# Patient Record
Sex: FEMALE | Race: WHITE
[De-identification: names, ages, dates, MRNs, and addresses within clinical notes are randomized per-mention and may not be internally consistent; named-entity substitution may affect disease eponyms.]

---

## 2017-04-12 ENCOUNTER — NON-APPOINTMENT (OUTPATIENT)
Age: 82
End: 2017-04-12

## 2017-04-12 ENCOUNTER — APPOINTMENT (OUTPATIENT)
Dept: INTERNAL MEDICINE | Facility: CLINIC | Age: 82
End: 2017-04-12

## 2017-04-12 ENCOUNTER — MEDICATION RENEWAL (OUTPATIENT)
Age: 82
End: 2017-04-12

## 2017-04-12 VITALS
BODY MASS INDEX: 27.82 KG/M2 | WEIGHT: 137.99 LBS | HEART RATE: 86 BPM | SYSTOLIC BLOOD PRESSURE: 132 MMHG | TEMPERATURE: 97.8 F | DIASTOLIC BLOOD PRESSURE: 72 MMHG | HEIGHT: 59 IN | RESPIRATION RATE: 16 BRPM | OXYGEN SATURATION: 95 %

## 2017-04-12 DIAGNOSIS — J45.909 UNSPECIFIED ASTHMA, UNCOMPLICATED: ICD-10-CM

## 2017-04-25 ENCOUNTER — RX RENEWAL (OUTPATIENT)
Age: 82
End: 2017-04-25

## 2017-07-31 ENCOUNTER — APPOINTMENT (OUTPATIENT)
Dept: INTERNAL MEDICINE | Facility: CLINIC | Age: 82
End: 2017-07-31
Payer: MEDICARE

## 2017-07-31 ENCOUNTER — OTHER (OUTPATIENT)
Age: 82
End: 2017-07-31

## 2017-07-31 ENCOUNTER — NON-APPOINTMENT (OUTPATIENT)
Age: 82
End: 2017-07-31

## 2017-07-31 VITALS
TEMPERATURE: 98.2 F | SYSTOLIC BLOOD PRESSURE: 122 MMHG | OXYGEN SATURATION: 95 % | HEIGHT: 60 IN | DIASTOLIC BLOOD PRESSURE: 68 MMHG | HEART RATE: 91 BPM | BODY MASS INDEX: 26.5 KG/M2 | RESPIRATION RATE: 18 BRPM | WEIGHT: 134.99 LBS

## 2017-07-31 PROCEDURE — 99214 OFFICE O/P EST MOD 30 MIN: CPT | Mod: 25

## 2017-07-31 PROCEDURE — 94060 EVALUATION OF WHEEZING: CPT

## 2017-08-30 LAB
CHOLEST SERPL-MCNC: 175
GLUCOSE SERPL-MCNC: 104
HBA1C MFR BLD HPLC: 5.9
HDLC SERPL-MCNC: 61
LDLC SERPL CALC-MCNC: 100

## 2018-01-12 ENCOUNTER — RX RENEWAL (OUTPATIENT)
Age: 83
End: 2018-01-12

## 2018-02-05 ENCOUNTER — OTHER (OUTPATIENT)
Age: 83
End: 2018-02-05

## 2018-02-21 LAB
CHOLEST SERPL-MCNC: 166
GLUCOSE SERPL-MCNC: 98
HBA1C MFR BLD HPLC: 5.7
HDLC SERPL-MCNC: 66
LIPID PNL WITH DIRECT LDL SERPL: 87

## 2018-03-19 ENCOUNTER — APPOINTMENT (OUTPATIENT)
Dept: INTERNAL MEDICINE | Facility: CLINIC | Age: 83
End: 2018-03-19
Payer: MEDICARE

## 2018-03-19 VITALS
HEART RATE: 98 BPM | SYSTOLIC BLOOD PRESSURE: 118 MMHG | TEMPERATURE: 98.4 F | OXYGEN SATURATION: 97 % | DIASTOLIC BLOOD PRESSURE: 76 MMHG | BODY MASS INDEX: 28.43 KG/M2 | WEIGHT: 141 LBS | RESPIRATION RATE: 20 BRPM | HEIGHT: 59 IN

## 2018-03-19 PROCEDURE — 99214 OFFICE O/P EST MOD 30 MIN: CPT | Mod: 25

## 2018-03-19 PROCEDURE — 94010 BREATHING CAPACITY TEST: CPT

## 2018-03-27 ENCOUNTER — RX RENEWAL (OUTPATIENT)
Age: 83
End: 2018-03-27

## 2018-04-23 ENCOUNTER — RX RENEWAL (OUTPATIENT)
Age: 83
End: 2018-04-23

## 2018-04-23 DIAGNOSIS — J30.2 OTHER ALLERGIC RHINITIS: ICD-10-CM

## 2018-04-23 DIAGNOSIS — J30.89 OTHER ALLERGIC RHINITIS: ICD-10-CM

## 2018-05-08 ENCOUNTER — RX RENEWAL (OUTPATIENT)
Age: 83
End: 2018-05-08

## 2018-05-08 RX ORDER — FLUTICASONE PROPIONATE 50 UG/1
50 SPRAY, METERED NASAL DAILY
Qty: 3 | Refills: 1 | Status: ACTIVE | COMMUNITY
Start: 2018-04-23 | End: 1900-01-01

## 2018-05-08 RX ORDER — ALBUTEROL SULFATE 90 UG/1
108 (90 BASE) AEROSOL, METERED RESPIRATORY (INHALATION)
Qty: 3 | Refills: 2 | Status: ACTIVE | COMMUNITY
Start: 2018-05-08 | End: 1900-01-01

## 2018-05-08 RX ORDER — FLUTICASONE PROPIONATE AND SALMETEROL 50; 250 UG/1; UG/1
250-50 POWDER RESPIRATORY (INHALATION) TWICE DAILY
Qty: 3 | Refills: 1 | Status: ACTIVE | COMMUNITY
Start: 2018-04-23 | End: 1900-01-01

## 2018-05-11 ENCOUNTER — APPOINTMENT (OUTPATIENT)
Dept: INTERNAL MEDICINE | Facility: CLINIC | Age: 83
End: 2018-05-11
Payer: MEDICARE

## 2018-05-11 VITALS
SYSTOLIC BLOOD PRESSURE: 130 MMHG | TEMPERATURE: 98.3 F | BODY MASS INDEX: 29.03 KG/M2 | HEIGHT: 59 IN | WEIGHT: 144 LBS | DIASTOLIC BLOOD PRESSURE: 70 MMHG | OXYGEN SATURATION: 97 % | HEART RATE: 70 BPM | RESPIRATION RATE: 16 BRPM

## 2018-05-11 DIAGNOSIS — Z86.69 PERSONAL HISTORY OF OTHER DISEASES OF THE NERVOUS SYSTEM AND SENSE ORGANS: ICD-10-CM

## 2018-05-11 DIAGNOSIS — H92.02 OTALGIA, LEFT EAR: ICD-10-CM

## 2018-05-11 PROCEDURE — 99213 OFFICE O/P EST LOW 20 MIN: CPT

## 2018-06-07 ENCOUNTER — RX RENEWAL (OUTPATIENT)
Age: 83
End: 2018-06-07

## 2018-07-11 ENCOUNTER — RX RENEWAL (OUTPATIENT)
Age: 83
End: 2018-07-11

## 2018-09-26 ENCOUNTER — APPOINTMENT (OUTPATIENT)
Dept: INTERNAL MEDICINE | Facility: CLINIC | Age: 83
End: 2018-09-26
Payer: MEDICARE

## 2018-09-26 ENCOUNTER — NON-APPOINTMENT (OUTPATIENT)
Age: 83
End: 2018-09-26

## 2018-09-26 VITALS
TEMPERATURE: 98 F | SYSTOLIC BLOOD PRESSURE: 120 MMHG | BODY MASS INDEX: 27.01 KG/M2 | HEART RATE: 82 BPM | DIASTOLIC BLOOD PRESSURE: 86 MMHG | WEIGHT: 134 LBS | OXYGEN SATURATION: 97 % | HEIGHT: 59 IN | RESPIRATION RATE: 16 BRPM

## 2018-09-26 PROCEDURE — 90662 IIV NO PRSV INCREASED AG IM: CPT

## 2018-09-26 PROCEDURE — 99214 OFFICE O/P EST MOD 30 MIN: CPT | Mod: 25

## 2018-09-26 PROCEDURE — 94060 EVALUATION OF WHEEZING: CPT

## 2018-09-26 PROCEDURE — G0008: CPT

## 2018-11-05 ENCOUNTER — RESULT REVIEW (OUTPATIENT)
Age: 83
End: 2018-11-05

## 2018-11-21 ENCOUNTER — MEDICATION RENEWAL (OUTPATIENT)
Age: 83
End: 2018-11-21

## 2018-12-03 ENCOUNTER — MEDICATION RENEWAL (OUTPATIENT)
Age: 83
End: 2018-12-03

## 2018-12-03 RX ORDER — MONTELUKAST 10 MG/1
10 TABLET, FILM COATED ORAL
Qty: 90 | Refills: 1 | Status: ACTIVE | COMMUNITY
Start: 2017-04-25 | End: 1900-01-01

## 2018-12-24 ENCOUNTER — RESULT REVIEW (OUTPATIENT)
Age: 83
End: 2018-12-24

## 2019-01-02 ENCOUNTER — APPOINTMENT (OUTPATIENT)
Dept: INTERNAL MEDICINE | Facility: CLINIC | Age: 84
End: 2019-01-02

## 2019-02-15 ENCOUNTER — APPOINTMENT (OUTPATIENT)
Dept: INTERNAL MEDICINE | Facility: CLINIC | Age: 84
End: 2019-02-15
Payer: MEDICARE

## 2019-02-15 VITALS
DIASTOLIC BLOOD PRESSURE: 68 MMHG | WEIGHT: 135 LBS | RESPIRATION RATE: 18 BRPM | BODY MASS INDEX: 27.21 KG/M2 | TEMPERATURE: 97.7 F | OXYGEN SATURATION: 97 % | SYSTOLIC BLOOD PRESSURE: 100 MMHG | HEART RATE: 81 BPM | HEIGHT: 59 IN

## 2019-02-15 DIAGNOSIS — Z00.00 ENCOUNTER FOR GENERAL ADULT MEDICAL EXAMINATION W/OUT ABNORMAL FINDINGS: ICD-10-CM

## 2019-02-15 PROCEDURE — 94729 DIFFUSING CAPACITY: CPT

## 2019-02-15 PROCEDURE — ZZZZZ: CPT

## 2019-02-15 PROCEDURE — 94727 GAS DIL/WSHOT DETER LNG VOL: CPT

## 2019-02-15 PROCEDURE — 90471 IMMUNIZATION ADMIN: CPT | Mod: GY

## 2019-02-15 PROCEDURE — 99214 OFFICE O/P EST MOD 30 MIN: CPT | Mod: 25

## 2019-02-15 PROCEDURE — 90750 HZV VACC RECOMBINANT IM: CPT | Mod: GY

## 2019-02-15 PROCEDURE — 94060 EVALUATION OF WHEEZING: CPT

## 2019-02-15 PROCEDURE — G0442 ANNUAL ALCOHOL SCREEN 15 MIN: CPT | Mod: 59

## 2019-02-15 NOTE — PHYSICAL EXAM
[No Acute Distress] : no acute distress [Well Nourished] : well nourished [Well Developed] : well developed [Well-Appearing] : well-appearing [Normal Sclera/Conjunctiva] : normal sclera/conjunctiva [PERRL] : pupils equal round and reactive to light [Normal Outer Ear/Nose] : the outer ears and nose were normal in appearance [Normal Oropharynx] : the oropharynx was normal [No JVD] : no jugular venous distention [Supple] : supple [No Lymphadenopathy] : no lymphadenopathy [Thyroid Normal, No Nodules] : the thyroid was normal and there were no nodules present [No Respiratory Distress] : no respiratory distress  [Clear to Auscultation] : lungs were clear to auscultation bilaterally [No Accessory Muscle Use] : no accessory muscle use [Normal Rate] : normal rate  [Regular Rhythm] : with a regular rhythm [Normal S1, S2] : normal S1 and S2 [No Edema] : there was no peripheral edema [No Extremity Clubbing/Cyanosis] : no extremity clubbing/cyanosis [Soft] : abdomen soft [Non Tender] : non-tender [Non-distended] : non-distended [No HSM] : no HSM [Normal Bowel Sounds] : normal bowel sounds [Normal Posterior Cervical Nodes] : no posterior cervical lymphadenopathy [Normal Anterior Cervical Nodes] : no anterior cervical lymphadenopathy [Kyphosis] : kyphosis [No Rash] : no rash [No Focal Deficits] : no focal deficits [Normal Affect] : the affect was normal [Normal Insight/Judgement] : insight and judgment were intact [de-identified] : 2/6 lizeth

## 2019-02-15 NOTE — HEALTH RISK ASSESSMENT
[] : No [Patient reported colonoscopy was normal] : Patient reported colonoscopy was normal [ColonoscopyDate] : 12/2018 [ColonoscopyComments] : benign polyp

## 2019-02-15 NOTE — DATA REVIEWED
[FreeTextEntry1] : Full pulmonary function testing today reveals normal spirometry with an FEV1 of 1.02 or 92% predicted. DLCO is mildly reduced.

## 2019-02-15 NOTE — HISTORY OF PRESENT ILLNESS
[FreeTextEntry1] : RV, COPD w/ asthma [de-identified] : This is a pleasant 89-year-old female who comes in for a following appointment. she has a history of COPD with asthma and has been generally doing well. Not having any recent coughing, wheezing, dyspnea on exertion, or sputum production. No recent fever or chills.\par \par She does have a history of low serum iron and is followed Dr. Alvarez gastroenterology. She did have recent upper endoscopy and colonoscopy late last year.\par \par She is not having any chest pain or palpitations.

## 2019-02-15 NOTE — REVIEW OF SYSTEMS
[Fever] : no fever [Chills] : no chills [Chest Pain] : no chest pain [Palpitations] : no palpitations [Shortness Of Breath] : no shortness of breath [Wheezing] : no wheezing [Cough] : no cough

## 2019-02-15 NOTE — ASSESSMENT
[FreeTextEntry1] : #1 COPD with asthma. Continue Advair 250 strength and montelukast.\par \par #2 healthcare maintenance. Patient given Shingres vaccination today. Return in 3 months for second vaccination.\par \par #3 low serum iron level. Continue Prilosec. Follows with gastroenterology Dr. Montaño.\par \par #4 HTN.\par \par #5 HPL.\par \par RV 3 months.

## 2019-05-15 ENCOUNTER — APPOINTMENT (OUTPATIENT)
Dept: INTERNAL MEDICINE | Facility: CLINIC | Age: 84
End: 2019-05-15
Payer: MEDICARE

## 2019-05-15 VITALS
SYSTOLIC BLOOD PRESSURE: 110 MMHG | BODY MASS INDEX: 27.42 KG/M2 | TEMPERATURE: 97.8 F | HEIGHT: 59 IN | WEIGHT: 136 LBS | RESPIRATION RATE: 16 BRPM | HEART RATE: 92 BPM | OXYGEN SATURATION: 96 % | DIASTOLIC BLOOD PRESSURE: 60 MMHG

## 2019-05-15 DIAGNOSIS — I10 ESSENTIAL (PRIMARY) HYPERTENSION: ICD-10-CM

## 2019-05-15 DIAGNOSIS — E78.00 PURE HYPERCHOLESTEROLEMIA, UNSPECIFIED: ICD-10-CM

## 2019-05-15 DIAGNOSIS — J44.9 CHRONIC OBSTRUCTIVE PULMONARY DISEASE, UNSPECIFIED: ICD-10-CM

## 2019-05-15 PROCEDURE — 90750 HZV VACC RECOMBINANT IM: CPT | Mod: GY

## 2019-05-15 PROCEDURE — 90471 IMMUNIZATION ADMIN: CPT | Mod: GY

## 2019-05-15 PROCEDURE — 99214 OFFICE O/P EST MOD 30 MIN: CPT | Mod: 25

## 2019-05-15 NOTE — REVIEW OF SYSTEMS
[Fever] : no fever [Chills] : no chills [Shortness Of Breath] : no shortness of breath [Wheezing] : no wheezing [Cough] : no cough

## 2019-05-15 NOTE — HISTORY OF PRESENT ILLNESS
[FreeTextEntry1] : RV [de-identified] : This is a pleasant 89-year-old female who returns for a following appointment. She will receive her shingrex vaccination #2 today.\par \par She does have a history of COPD with asthma and is maintained on Advair and montelukast.  she is not having recent coughing, wheezing, dyspnea on exertion, or sputum production. Has not needed to use her rescue proair inhaler recently.\par \par She is not having recent fever or chills.\par \par

## 2019-05-15 NOTE — PHYSICAL EXAM
[No Acute Distress] : no acute distress [Well Nourished] : well nourished [Well Developed] : well developed [Well-Appearing] : well-appearing [Normal Sclera/Conjunctiva] : normal sclera/conjunctiva [PERRL] : pupils equal round and reactive to light [Normal Outer Ear/Nose] : the outer ears and nose were normal in appearance [Normal Oropharynx] : the oropharynx was normal [No JVD] : no jugular venous distention [Supple] : supple [No Lymphadenopathy] : no lymphadenopathy [No Respiratory Distress] : no respiratory distress  [Clear to Auscultation] : lungs were clear to auscultation bilaterally [No Accessory Muscle Use] : no accessory muscle use [Normal Rate] : normal rate  [Regular Rhythm] : with a regular rhythm [Normal S1, S2] : normal S1 and S2 [No Edema] : there was no peripheral edema [No Extremity Clubbing/Cyanosis] : no extremity clubbing/cyanosis [Soft] : abdomen soft [Non Tender] : non-tender [Non-distended] : non-distended [No HSM] : no HSM [Normal Bowel Sounds] : normal bowel sounds [Normal Posterior Cervical Nodes] : no posterior cervical lymphadenopathy [Normal Anterior Cervical Nodes] : no anterior cervical lymphadenopathy [Kyphosis] : kyphosis [No Rash] : no rash [No Focal Deficits] : no focal deficits [Normal Affect] : the affect was normal [Normal Insight/Judgement] : insight and judgment were intact

## 2019-05-15 NOTE — ASSESSMENT
[FreeTextEntry1] : #1 COPD with asthma. continue Advair and montelukast. Continue rescue proair on an as needed basis.\par \par #2 hypertension. Losartan.\par \par #3 hyperlipidemia. Atorvastatin.\par \par #4 HM. Shingrex vaccination given today, #2 in series.\par \par RV 6 months.

## 2019-06-18 ENCOUNTER — APPOINTMENT (OUTPATIENT)
Dept: INTERNAL MEDICINE | Facility: CLINIC | Age: 84
End: 2019-06-18

## 2019-07-02 ENCOUNTER — APPOINTMENT (OUTPATIENT)
Dept: CARDIOLOGY | Facility: CLINIC | Age: 84
End: 2019-07-02
Payer: MEDICARE

## 2019-07-02 VITALS
RESPIRATION RATE: 16 BRPM | HEIGHT: 59 IN | WEIGHT: 139 LBS | BODY MASS INDEX: 28.02 KG/M2 | HEART RATE: 84 BPM | DIASTOLIC BLOOD PRESSURE: 76 MMHG | SYSTOLIC BLOOD PRESSURE: 130 MMHG | OXYGEN SATURATION: 96 % | TEMPERATURE: 98.7 F

## 2019-07-02 PROCEDURE — 99214 OFFICE O/P EST MOD 30 MIN: CPT | Mod: 25

## 2019-07-02 PROCEDURE — 93000 ELECTROCARDIOGRAM COMPLETE: CPT

## 2019-07-08 ENCOUNTER — RECORD ABSTRACTING (OUTPATIENT)
Age: 84
End: 2019-07-08

## 2019-07-08 RX ORDER — OMEGA-3/DHA/EPA/FISH OIL 300-1000MG
CAPSULE ORAL
Refills: 0 | Status: ACTIVE | COMMUNITY

## 2019-07-08 RX ORDER — OMEPRAZOLE MAGNESIUM 20 MG/1
20 TABLET, DELAYED RELEASE ORAL TWICE DAILY
Refills: 3 | Status: ACTIVE | COMMUNITY

## 2019-07-08 RX ORDER — PSYLLIUM HUSK 0.4 G
CAPSULE ORAL DAILY
Refills: 0 | Status: ACTIVE | COMMUNITY

## 2019-07-08 RX ORDER — UBIDECARENONE/VIT E ACET 100MG-5
1000 CAPSULE ORAL
Refills: 0 | Status: ACTIVE | COMMUNITY

## 2019-07-08 RX ORDER — UBIDECARENONE 200 MG
CAPSULE ORAL DAILY
Refills: 0 | Status: ACTIVE | COMMUNITY

## 2019-07-08 RX ORDER — VITS A,C,E/LUTEIN/MINERALS 300MCG-200
TABLET ORAL DAILY
Refills: 0 | Status: ACTIVE | COMMUNITY

## 2019-08-29 ENCOUNTER — EMERGENCY (EMERGENCY)
Facility: HOSPITAL | Age: 84
LOS: 0 days | Discharge: HOME | End: 2019-08-29
Attending: EMERGENCY MEDICINE
Payer: MEDICARE

## 2019-08-29 VITALS — WEIGHT: 139.99 LBS

## 2019-08-29 VITALS
DIASTOLIC BLOOD PRESSURE: 72 MMHG | TEMPERATURE: 99 F | HEART RATE: 85 BPM | SYSTOLIC BLOOD PRESSURE: 120 MMHG | RESPIRATION RATE: 18 BRPM | OXYGEN SATURATION: 99 %

## 2019-08-29 DIAGNOSIS — E78.5 HYPERLIPIDEMIA, UNSPECIFIED: ICD-10-CM

## 2019-08-29 DIAGNOSIS — M79.602 PAIN IN LEFT ARM: ICD-10-CM

## 2019-08-29 DIAGNOSIS — I10 ESSENTIAL (PRIMARY) HYPERTENSION: ICD-10-CM

## 2019-08-29 DIAGNOSIS — J45.909 UNSPECIFIED ASTHMA, UNCOMPLICATED: ICD-10-CM

## 2019-08-29 LAB
ALBUMIN SERPL ELPH-MCNC: 3.7 G/DL — SIGNIFICANT CHANGE UP (ref 3.3–5)
ALP SERPL-CCNC: 67 U/L — SIGNIFICANT CHANGE UP (ref 40–120)
ALT FLD-CCNC: 17 U/L — SIGNIFICANT CHANGE UP (ref 12–78)
ANION GAP SERPL CALC-SCNC: 8 MMOL/L — SIGNIFICANT CHANGE UP (ref 5–17)
APTT BLD: 27.7 SEC — SIGNIFICANT CHANGE UP (ref 27.5–36.3)
AST SERPL-CCNC: 17 U/L — SIGNIFICANT CHANGE UP (ref 15–37)
BILIRUB SERPL-MCNC: 0.6 MG/DL — SIGNIFICANT CHANGE UP (ref 0.2–1.2)
BUN SERPL-MCNC: 17 MG/DL — SIGNIFICANT CHANGE UP (ref 7–23)
CALCIUM SERPL-MCNC: 9.4 MG/DL — SIGNIFICANT CHANGE UP (ref 8.5–10.1)
CHLORIDE SERPL-SCNC: 108 MMOL/L — SIGNIFICANT CHANGE UP (ref 96–108)
CO2 SERPL-SCNC: 28 MMOL/L — SIGNIFICANT CHANGE UP (ref 22–31)
CREAT SERPL-MCNC: 0.99 MG/DL — SIGNIFICANT CHANGE UP (ref 0.5–1.3)
GLUCOSE SERPL-MCNC: 108 MG/DL — HIGH (ref 70–99)
HCT VFR BLD CALC: 33.9 % — LOW (ref 34.5–45)
HGB BLD-MCNC: 10.8 G/DL — LOW (ref 11.5–15.5)
INR BLD: 0.97 RATIO — SIGNIFICANT CHANGE UP (ref 0.88–1.16)
MAGNESIUM SERPL-MCNC: 1.9 MG/DL — SIGNIFICANT CHANGE UP (ref 1.6–2.6)
MCHC RBC-ENTMCNC: 27.6 PG — SIGNIFICANT CHANGE UP (ref 27–34)
MCHC RBC-ENTMCNC: 31.9 GM/DL — LOW (ref 32–36)
MCV RBC AUTO: 86.7 FL — SIGNIFICANT CHANGE UP (ref 80–100)
NT-PROBNP SERPL-SCNC: 148 PG/ML — SIGNIFICANT CHANGE UP (ref 0–450)
PLATELET # BLD AUTO: 229 K/UL — SIGNIFICANT CHANGE UP (ref 150–400)
POTASSIUM SERPL-MCNC: 4.9 MMOL/L — SIGNIFICANT CHANGE UP (ref 3.5–5.3)
POTASSIUM SERPL-SCNC: 4.9 MMOL/L — SIGNIFICANT CHANGE UP (ref 3.5–5.3)
PROT SERPL-MCNC: 7.3 GM/DL — SIGNIFICANT CHANGE UP (ref 6–8.3)
PROTHROM AB SERPL-ACNC: 10.7 SEC — SIGNIFICANT CHANGE UP (ref 10–12.9)
RBC # BLD: 3.91 M/UL — SIGNIFICANT CHANGE UP (ref 3.8–5.2)
RBC # FLD: 15.8 % — HIGH (ref 10.3–14.5)
SODIUM SERPL-SCNC: 144 MMOL/L — SIGNIFICANT CHANGE UP (ref 135–145)
TROPONIN I SERPL-MCNC: <0.015 NG/ML — SIGNIFICANT CHANGE UP (ref 0.01–0.04)
TROPONIN I SERPL-MCNC: <0.015 NG/ML — SIGNIFICANT CHANGE UP (ref 0.01–0.04)
WBC # BLD: 6.2 K/UL — SIGNIFICANT CHANGE UP (ref 3.8–10.5)
WBC # FLD AUTO: 6.2 K/UL — SIGNIFICANT CHANGE UP (ref 3.8–10.5)

## 2019-08-29 PROCEDURE — 71045 X-RAY EXAM CHEST 1 VIEW: CPT | Mod: 26

## 2019-08-29 PROCEDURE — 84484 ASSAY OF TROPONIN QUANT: CPT

## 2019-08-29 PROCEDURE — 71045 X-RAY EXAM CHEST 1 VIEW: CPT

## 2019-08-29 PROCEDURE — 85610 PROTHROMBIN TIME: CPT

## 2019-08-29 PROCEDURE — 83735 ASSAY OF MAGNESIUM: CPT

## 2019-08-29 PROCEDURE — 80053 COMPREHEN METABOLIC PANEL: CPT

## 2019-08-29 PROCEDURE — 93010 ELECTROCARDIOGRAM REPORT: CPT

## 2019-08-29 PROCEDURE — 85730 THROMBOPLASTIN TIME PARTIAL: CPT

## 2019-08-29 PROCEDURE — 85027 COMPLETE CBC AUTOMATED: CPT

## 2019-08-29 PROCEDURE — 99284 EMERGENCY DEPT VISIT MOD MDM: CPT | Mod: 25

## 2019-08-29 PROCEDURE — 36415 COLL VENOUS BLD VENIPUNCTURE: CPT

## 2019-08-29 PROCEDURE — 83880 ASSAY OF NATRIURETIC PEPTIDE: CPT

## 2019-08-29 PROCEDURE — 93005 ELECTROCARDIOGRAM TRACING: CPT

## 2019-08-29 PROCEDURE — 99284 EMERGENCY DEPT VISIT MOD MDM: CPT

## 2019-08-29 NOTE — ED PROVIDER NOTE - ATTENDING CONTRIBUTION TO CARE
89F hx HTN c/o intermittent left arm pain - seems mechanical, less likely atypical ACS  Plan: labs, XR, ECG, reassess

## 2019-08-29 NOTE — ED STATDOCS - PROGRESS NOTE DETAILS
Jenna PERSAUD for ED attending Dr. Canela: 89 y/o female with a PMHx of asthma on Advair, HTN on Losartan, HLD on Lipitor presents to the ED c/o intermittent left arm and neck pain since 08/27. Hx of similar symptoms with no evaluation. Pt states her pain resolved spontaneously yesterday, but has returned today. At time of exam pt notes she is experiencing an achy "heaviness" in the left side of her neck. No Hx of CAD, MI. Takes 81 mg ASA every day, taking an extra dose today for symptoms. Denies lightheadedness, CP, N/V. No radiation of pain to chest. Hx of nuclear stress test with Dr. Levi years ago. Also takes Flonase. No PCP. Non-smoker. No EtOH use. Will send pt to main ED for further evaluation.

## 2019-08-29 NOTE — ED PROVIDER NOTE - NSFOLLOWUPINSTRUCTIONS_ED_ALL_ED_FT
Please return to the ER if you develop chest pain, difficulty breathing, weakness, fevers, persistent vomiting, or have any other concerns.  Please take tylenol 650 mg every 6-8 hours as needed for pain. Please do not exceed more than 4,000mg of Tylenol in a day  Please follow up with you primary care doctor for further evaluation and treatment.

## 2019-08-29 NOTE — ED PROVIDER NOTE - CLINICAL SUMMARY MEDICAL DECISION MAKING FREE TEXT BOX
Low suspicion for acs. well appearing with reassuring exam. Will get labs, ekg, reassess. likely d/c home

## 2019-08-29 NOTE — ED PROVIDER NOTE - OBJECTIVE STATEMENT
89F hx htn, hld p/w arm pain. Patient reports intermittent L arm pain and shoulder/neck heaviness that started tuesday. Better with motrin and bengay. Denies cp, sob, fevers, cough/recent infection, weakness, nausea/vomiting. No known exacerbating factors. non exertional.

## 2019-08-29 NOTE — ED PROVIDER NOTE - PATIENT PORTAL LINK FT
You can access the FollowMyHealth Patient Portal offered by [facility] by registering at the following website: [email or web address]. By joining [facility]’s FollowMyHealth portal, you will also be able to view your health information using other applications (apps) compatible with our system.

## 2019-08-29 NOTE — ED ADULT TRIAGE NOTE - CHIEF COMPLAINT QUOTE
chele states she had left shoulder pain radiating to her left neck started tuesday, better yesterday, worse today.  she was seen at urgent care and sent to hosptial for further evaluation.  she states pain is dull ache at base of her neck, arm pain has resolved at this time.  states she has a h/o asthma, shortness of breath at baseline.

## 2019-09-04 PROBLEM — E78.5 HYPERLIPIDEMIA, UNSPECIFIED: Chronic | Status: ACTIVE | Noted: 2019-08-31

## 2019-09-04 PROBLEM — I10 ESSENTIAL (PRIMARY) HYPERTENSION: Chronic | Status: ACTIVE | Noted: 2019-08-31

## 2019-09-04 PROBLEM — J45.909 UNSPECIFIED ASTHMA, UNCOMPLICATED: Chronic | Status: ACTIVE | Noted: 2019-08-31

## 2019-09-05 ENCOUNTER — APPOINTMENT (OUTPATIENT)
Dept: GASTROENTEROLOGY | Facility: CLINIC | Age: 84
End: 2019-09-05
Payer: MEDICARE

## 2019-09-05 VITALS
DIASTOLIC BLOOD PRESSURE: 75 MMHG | HEART RATE: 99 BPM | BODY MASS INDEX: 27.67 KG/M2 | SYSTOLIC BLOOD PRESSURE: 143 MMHG | WEIGHT: 137 LBS

## 2019-09-05 DIAGNOSIS — Z12.11 ENCOUNTER FOR SCREENING FOR MALIGNANT NEOPLASM OF COLON: ICD-10-CM

## 2019-09-05 PROCEDURE — 82274 ASSAY TEST FOR BLOOD FECAL: CPT | Mod: QW

## 2019-09-05 PROCEDURE — 99214 OFFICE O/P EST MOD 30 MIN: CPT | Mod: 25

## 2019-09-05 RX ORDER — DOXYCYCLINE HYCLATE 100 MG/1
100 TABLET ORAL DAILY
Qty: 14 | Refills: 0 | Status: DISCONTINUED | COMMUNITY
Start: 2017-04-12 | End: 2019-09-05

## 2019-09-05 RX ORDER — METHYLPREDNISOLONE 4 MG/1
4 TABLET ORAL
Qty: 1 | Refills: 0 | Status: DISCONTINUED | COMMUNITY
Start: 2017-04-12 | End: 2019-09-05

## 2019-09-05 RX ORDER — CIPROFLOXACIN HYDROCHLORIDE AND HYDROCORTISONE 2; 10 MG/ML; MG/ML
0.2-1 SUSPENSION/ DROPS AURICULAR (OTIC) TWICE DAILY
Qty: 1 | Refills: 0 | Status: DISCONTINUED | COMMUNITY
Start: 2018-05-11 | End: 2019-09-05

## 2019-09-05 NOTE — ASSESSMENT
[FreeTextEntry1] : Hemoccult positive stools with history of large hiatal hernia and colonic polyp Hemoccult-negative today will schedule capsule endoscopy to rule out small bowel cause but likely explanation is his large hiatal hernia so she will need to continue PPI therapy b.i.d.

## 2019-09-05 NOTE — HISTORY OF PRESENT ILLNESS
[FreeTextEntry1] : Patient had an iron deficiency and had a colonoscopy and upper endoscopy 7 months ago which demonstrated a large hiatal hernia and colonic polyp she was placed on PPI therapy and subsequent Hemoccult testing was normal she presents today with episode of Hemoccult positive on physical exam recently she denies nonsteroidal anti-inflammatory drugs alcohol use dysphagia abdominal pain nausea vomiting or any evidence of bleeding

## 2019-09-05 NOTE — PHYSICAL EXAM
[General Appearance - Alert] : alert [General Appearance - In No Acute Distress] : in no acute distress [PERRL With Normal Accommodation] : pupils were equal in size, round, and reactive to light [Sclera] : the sclera and conjunctiva were normal [Extraocular Movements] : extraocular movements were intact [Oropharynx] : the oropharynx was normal [Outer Ear] : the ears and nose were normal in appearance [Neck Cervical Mass (___cm)] : no neck mass was observed [Neck Appearance] : the appearance of the neck was normal [Jugular Venous Distention Increased] : there was no jugular-venous distention [Thyroid Nodule] : there were no palpable thyroid nodules [Thyroid Diffuse Enlargement] : the thyroid was not enlarged [Auscultation Breath Sounds / Voice Sounds] : lungs were clear to auscultation bilaterally [Heart Rate And Rhythm] : heart rate was normal and rhythm regular [Heart Sounds] : normal S1 and S2 [Heart Sounds Gallop] : no gallops [Murmurs] : no murmurs [Heart Sounds Pericardial Friction Rub] : no pericardial rub [Abdomen Soft] : soft [Bowel Sounds] : normal bowel sounds [Abdomen Mass (___ Cm)] : no abdominal mass palpated [Abdomen Tenderness] : non-tender [] : no hepato-splenomegaly [Normal Sphincter Tone] : normal sphincter tone [No Rectal Mass] : no rectal mass [Occult Blood Positive] : stool was negative for occult blood [FreeTextEntry1] : kyphosis [No Focal Deficits] : no focal deficits [Sensation] : the sensory exam was normal to light touch and pinprick [Deep Tendon Reflexes (DTR)] : deep tendon reflexes were 2+ and symmetric [Oriented To Time, Place, And Person] : oriented to person, place, and time [Impaired Insight] : insight and judgment were intact [Affect] : the affect was normal

## 2019-09-10 ENCOUNTER — APPOINTMENT (OUTPATIENT)
Dept: GASTROENTEROLOGY | Facility: CLINIC | Age: 84
End: 2019-09-10
Payer: MEDICARE

## 2019-09-10 PROCEDURE — 91110 GI TRC IMG INTRAL ESOPH-ILE: CPT

## 2019-09-11 NOTE — HISTORY OF PRESENT ILLNESS
[de-identified] : 89 y/o with iron deficiency anemia here for capsule study today. \par \par I have reviewed the risks, benefits, and alternatives of the small bowel capsule endoscopy.  I discussed with the patient including missed lesions as well as capsule retention that could possibly result in bowel obstruction to necessitate surgical removal.  The informed written consent was obtained prior to ingestion of the pill cam.  The patient ingested the small bowel capsule without difficult.  Patient tolerated the procedure well, without any immediate complications.  Real time video shows capsule in the stomach.  The patient was instructed to contact the office with any questions or concerns.  Advised if going for MRI within the month patient will need abdominal X-ray to check the capsule exit. \par \par Post capsule ingestion instructions were given to the patient.\par

## 2019-09-11 NOTE — ASSESSMENT
[FreeTextEntry1] : 89 y/o female with iron deficiency anemia here for a capsule study to evaluate her small bowel. \par Swallowed pill without any difficulty, pt to return back to office at 4pm this evening. \par Discussed with Dr. Wagner.

## 2019-10-07 ENCOUNTER — APPOINTMENT (OUTPATIENT)
Dept: CARDIOLOGY | Facility: CLINIC | Age: 84
End: 2019-10-07

## 2019-11-22 ENCOUNTER — RX RENEWAL (OUTPATIENT)
Age: 84
End: 2019-11-22

## 2019-11-22 RX ORDER — LOSARTAN POTASSIUM 50 MG/1
50 TABLET, FILM COATED ORAL
Qty: 30 | Refills: 0 | Status: ACTIVE | COMMUNITY
Start: 2018-01-12 | End: 1900-01-01

## 2020-02-14 ENCOUNTER — RX RENEWAL (OUTPATIENT)
Age: 85
End: 2020-02-14

## 2020-02-14 ENCOUNTER — TRANSCRIPTION ENCOUNTER (OUTPATIENT)
Age: 85
End: 2020-02-14

## 2020-05-14 ENCOUNTER — RX RENEWAL (OUTPATIENT)
Age: 85
End: 2020-05-14

## 2020-05-14 RX ORDER — ATORVASTATIN CALCIUM 40 MG/1
40 TABLET, FILM COATED ORAL
Qty: 90 | Refills: 3 | Status: ACTIVE | COMMUNITY
Start: 2017-04-25 | End: 1900-01-01

## 2020-09-04 ENCOUNTER — TRANSCRIPTION ENCOUNTER (OUTPATIENT)
Age: 85
End: 2020-09-04

## 2020-12-16 PROBLEM — Z86.69 HISTORY OF OTITIS MEDIA: Status: RESOLVED | Noted: 2018-05-11 | Resolved: 2020-12-16

## 2020-12-21 PROBLEM — Z12.11 ENCOUNTER FOR HEMOCCULT SCREENING: Status: RESOLVED | Noted: 2019-09-05 | Resolved: 2020-12-21

## 2021-10-06 PROBLEM — I10 ESSENTIAL HYPERTENSION: Status: ACTIVE | Noted: 2018-01-12

## 2023-09-14 NOTE — ED PROVIDER NOTE - PMH
Asthma    HLD (hyperlipidemia)    HTN (hypertension)
Patient/Caregiver provided printed discharge information.